# Patient Record
Sex: MALE | Race: BLACK OR AFRICAN AMERICAN | NOT HISPANIC OR LATINO | Employment: FULL TIME | URBAN - METROPOLITAN AREA
[De-identification: names, ages, dates, MRNs, and addresses within clinical notes are randomized per-mention and may not be internally consistent; named-entity substitution may affect disease eponyms.]

---

## 2019-09-17 ENCOUNTER — HOSPITAL ENCOUNTER (EMERGENCY)
Facility: HOSPITAL | Age: 19
Discharge: HOME | End: 2019-09-17
Attending: EMERGENCY MEDICINE

## 2019-09-17 ENCOUNTER — APPOINTMENT (EMERGENCY)
Dept: RADIOLOGY | Facility: HOSPITAL | Age: 19
End: 2019-09-17

## 2019-09-17 VITALS
TEMPERATURE: 97.7 F | DIASTOLIC BLOOD PRESSURE: 83 MMHG | RESPIRATION RATE: 16 BRPM | OXYGEN SATURATION: 100 % | WEIGHT: 194 LBS | SYSTOLIC BLOOD PRESSURE: 149 MMHG | HEART RATE: 59 BPM

## 2019-09-17 DIAGNOSIS — S41.131A GUNSHOT WOUND OF RIGHT UPPER ARM, INITIAL ENCOUNTER: Primary | ICD-10-CM

## 2019-09-17 PROCEDURE — 99283 EMERGENCY DEPT VISIT LOW MDM: CPT | Performed by: PHYSICIAN ASSISTANT

## 2019-09-17 PROCEDURE — 99284 EMERGENCY DEPT VISIT MOD MDM: CPT

## 2019-09-17 PROCEDURE — 73060 X-RAY EXAM OF HUMERUS: CPT

## 2019-09-17 RX ORDER — BACITRACIN, NEOMYCIN, POLYMYXIN B 400; 3.5; 5 [USP'U]/G; MG/G; [USP'U]/G
1 OINTMENT TOPICAL ONCE
Status: COMPLETED | OUTPATIENT
Start: 2019-09-17 | End: 2019-09-17

## 2019-09-17 RX ORDER — AMOXICILLIN 500 MG/1
500 CAPSULE ORAL 3 TIMES DAILY
Qty: 21 CAPSULE | Refills: 0 | Status: SHIPPED | OUTPATIENT
Start: 2019-09-17 | End: 2019-09-24

## 2019-09-17 RX ADMIN — BACITRACIN ZINC, NEOMYCIN SULFATE, AND POLYMYXIN B SULFATE 1 SMALL APPLICATION: 400; 3.5; 5 OINTMENT TOPICAL at 12:50

## 2019-09-17 NOTE — DISCHARGE INSTRUCTIONS
Cover wound with thin layer of Vaseline and bandage for 3-4 days  Continue to apply thin layer of vaseline for wound protection until healed  Apply ice to the wound for pain and swelling

## 2019-09-17 NOTE — ED NOTES
APD remains at [address] [facility] L, RN  09/17/19 2687 STEPHIE 
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
## 2019-09-26 ENCOUNTER — HOSPITAL ENCOUNTER (EMERGENCY)
Facility: HOSPITAL | Age: 19
Discharge: HOME | End: 2019-09-26
Attending: EMERGENCY MEDICINE

## 2019-09-26 VITALS
TEMPERATURE: 98.3 F | DIASTOLIC BLOOD PRESSURE: 60 MMHG | HEART RATE: 57 BPM | SYSTOLIC BLOOD PRESSURE: 129 MMHG | RESPIRATION RATE: 16 BRPM | WEIGHT: 197.75 LBS | OXYGEN SATURATION: 100 %

## 2019-09-26 DIAGNOSIS — T14.8XXA ABRASION: ICD-10-CM

## 2019-09-26 DIAGNOSIS — M79.601 RIGHT ARM PAIN: Primary | ICD-10-CM

## 2019-09-26 PROCEDURE — 99283 EMERGENCY DEPT VISIT LOW MDM: CPT

## 2019-09-26 PROCEDURE — 99282 EMERGENCY DEPT VISIT SF MDM: CPT | Performed by: PHYSICIAN ASSISTANT
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
## 2019-12-03 ENCOUNTER — HOSPITAL ENCOUNTER (EMERGENCY)
Facility: HOSPITAL | Age: 19
Discharge: HOME | End: 2019-12-03
Admitting: EMERGENCY MEDICINE
Payer: OTHER MISCELLANEOUS

## 2019-12-03 ENCOUNTER — APPOINTMENT (EMERGENCY)
Dept: RADIOLOGY | Facility: HOSPITAL | Age: 19
End: 2019-12-03
Payer: OTHER MISCELLANEOUS

## 2019-12-03 VITALS
TEMPERATURE: 97.7 F | SYSTOLIC BLOOD PRESSURE: 148 MMHG | RESPIRATION RATE: 18 BRPM | DIASTOLIC BLOOD PRESSURE: 99 MMHG | HEART RATE: 56 BPM | OXYGEN SATURATION: 97 %

## 2019-12-03 DIAGNOSIS — M25.511 ACUTE PAIN OF RIGHT SHOULDER: Primary | ICD-10-CM

## 2019-12-03 PROCEDURE — 73030 X-RAY EXAM OF SHOULDER: CPT

## 2019-12-03 PROCEDURE — 99283 EMERGENCY DEPT VISIT LOW MDM: CPT

## 2019-12-03 PROCEDURE — 99284 EMERGENCY DEPT VISIT MOD MDM: CPT | Performed by: EMERGENCY MEDICINE

## 2019-12-03 RX ORDER — LORATADINE 10 MG/1
10 TABLET ORAL
COMMUNITY
Start: 2014-09-25

## 2019-12-03 RX ORDER — IBUPROFEN 600 MG/1
600 TABLET ORAL EVERY 6 HOURS PRN
COMMUNITY
Start: 2019-11-27 | End: 2020-11-26

## 2019-12-03 RX ADMIN — DICLOFENAC 2 G: 10 GEL TOPICAL at 06:46

## 2019-12-03 NOTE — ED NOTES
Dr Edward Rivas and Dr Bill Carlton at bedside for patient evaluation        Vonda Willingham RN  12/03/19 7466

## 2019-12-03 NOTE — ED PROVIDER NOTES
History  Chief Complaint   Patient presents with    Shoulder Injury     Pt reports lifting package at work which began to fall and catching it with R arm      HPI    43-year-old male who presents to the ED for evaluation of right shoulder pain  The patient states that he works as a  at [employer], when he was lifting a crate of water when it fell pulling his right shoulder down  He has since had right shoulder pain, and while he states he is able to have full passive range of motion, he has significant pain with active range of motion beyond 90°  Pain localized nonradiating  Has not tried anything for his pain  The event occurred just prior to arrival   Denies any chest pain shortness of breath nausea, fevers chills remainder ROS negative  Prior to Admission Medications   Prescriptions Last Dose Informant Patient Reported? Taking?   ibuprofen (MOTRIN) 600 mg tablet   Yes Yes   Sig: Take 600 mg by mouth every 6 (six) hours as needed   loratadine (CLARITIN) 10 mg tablet   Yes Yes   Sig: 10 mg      Facility-Administered Medications: None       History reviewed  No pertinent past medical history  History reviewed  No pertinent surgical history  History reviewed  No pertinent family history  I have reviewed and agree with the history as documented  Social History     Tobacco Use    Smoking status: Never Smoker    Smokeless tobacco: Never Used   Substance Use Topics    Alcohol use: Not Currently    Drug use: Not Currently        Review of Systems   Constitutional: Negative for chills, fatigue and fever  HENT: Negative for nosebleeds and sore throat  Eyes: Negative for redness and visual disturbance  Respiratory: Negative for shortness of breath and wheezing  Cardiovascular: Negative for chest pain and palpitations  Gastrointestinal: Negative for abdominal pain and diarrhea  Endocrine: Negative for polyuria  Genitourinary: Negative for difficulty urinating and testicular pain  Musculoskeletal: Negative for back pain and neck stiffness  Right shoulder pain   Skin: Negative for rash and wound  Neurological: Negative for seizures, speech difficulty and headaches  Psychiatric/Behavioral: Negative for dysphoric mood and hallucinations  All other systems reviewed and are negative  Physical Exam  ED Triage Vitals [12/03/19 0522]   Temperature Pulse Respirations Blood Pressure SpO2   97 7 °F (36 5 °C) 56 18 148/99 97 %      Temp Source Heart Rate Source Patient Position - Orthostatic VS BP Location FiO2 (%)   Oral Monitor Lying Left arm --      Pain Score       5             Orthostatic Vital Signs  Vitals:    12/03/19 0522   BP: 148/99   Pulse: 56   Patient Position - Orthostatic VS: Lying       Physical Exam   Constitutional: He is oriented to person, place, and time  He appears well-developed and well-nourished  HENT:   Head: Normocephalic and atraumatic  Right Ear: External ear normal    Left Ear: External ear normal    Mouth/Throat: Oropharynx is clear and moist    Eyes: Conjunctivae and EOM are normal    Neck: Normal range of motion  Cardiovascular: Normal rate, regular rhythm, normal heart sounds and intact distal pulses  Pulmonary/Chest: Effort normal and breath sounds normal  He has no wheezes  He exhibits no tenderness  Abdominal: Soft  Bowel sounds are normal  There is no tenderness  There is no guarding  Musculoskeletal: Normal range of motion  On examination patient's right shoulder, does not appear to be dislocated, and shoulder is symmetric compared to the left, he has good sensation, radial medial ulnar nerve intact  Passively the shoulder can be ranged fully, however with active movement, the patient cannot go beyond 90°  He has difficulty with the lift-off test, Covington and Neer's test negative  Neurological: He is alert and oriented to person, place, and time  No cranial nerve deficit or sensory deficit  He exhibits normal muscle tone  Coordination normal    Skin: Skin is warm and dry  No rash noted  Psychiatric: He has a normal mood and affect  Nursing note and vitals reviewed  ED Medications  Medications - No data to display    Diagnostic Studies  Results Reviewed     None                 XR shoulder 2+ views RIGHT   ED Interpretation by Shelley Baker MD (12/03 0630)   No acute fracture      Final Result by Shaun Houston MD (12/03 0845)      No acute osseous abnormality  Workstation performed: [id number]               Procedures  Procedures      ED Course             MDM    60-year-old male who presents to ED for evaluation of right shoulder pain  X-ray shows no fracture or dislocation  The patient was given a sling  Orthopedic follow-up  Discharged home with return precautions  The patient was instructed to follow up as documented  Strict return precautions were discussed with the patient and the patient was instructed to return to the emergency department immediately if symptoms worsen  The patient/patient family member acknowledged and were in agreement with plan  Disposition  Final diagnoses:   Acute pain of right shoulder     Time reflects when diagnosis was documented in both MDM as applicable and the Disposition within this note     Time User Action Codes Description Comment    12/3/2019  6:30 AM Tamara Thomson Add [M25 511] Acute pain of right shoulder       ED Disposition     ED Disposition Condition Date/Time Comment    Discharge Stable Tue Dec 3, 2019  6:30 AM Flower Winters  discharge to home/self care              Follow-up Information     Follow up With Specialties Details Why Contact Info Additional [address] [facility] Surgery Schedule an appointment as soon as possible for a visit in 1 week For follow up regarding your symptoms and recheck [address] [phone] [facility] [place], [address] [place] [address], [place], [place], [address]  [facility]          Discharge Medication List as of 12/3/2019  6:30 AM      CONTINUE these medications which have NOT CHANGED    Details   ibuprofen (MOTRIN) 600 mg tablet Take 600 mg by mouth every 6 (six) hours as needed, Starting Wed 11/27/2019, Until Thu 11/26/2020, Historical Med      loratadine (CLARITIN) 10 mg tablet 10 mg, Starting Thu 9/25/2014, Historical Med           No discharge procedures on file  ED Provider  Attending physically available and evaluated Kenneth Amin I managed the patient along with the ED Attending      Electronically Signed by         Carolee Horn MD  12/05/19 3781

## 2019-12-10 ENCOUNTER — OFFICE VISIT (OUTPATIENT)
Dept: OBGYN CLINIC | Facility: OTHER | Age: 19
End: 2019-12-10

## 2019-12-10 VITALS
WEIGHT: 203 LBS | DIASTOLIC BLOOD PRESSURE: 77 MMHG | SYSTOLIC BLOOD PRESSURE: 123 MMHG | HEART RATE: 47 BPM | HEIGHT: 75 IN | BODY MASS INDEX: 25.24 KG/M2

## 2019-12-10 DIAGNOSIS — S46.911A STRAIN OF RIGHT SHOULDER, INITIAL ENCOUNTER: Primary | ICD-10-CM

## 2019-12-10 PROCEDURE — 99203 OFFICE O/P NEW LOW 30 MIN: CPT | Performed by: ORTHOPAEDIC SURGERY

## 2019-12-10 NOTE — PROGRESS NOTES
Assessment:       1  Strain of right shoulder, initial encounter          Plan:        Clinical and radiologic findings discussed with the patient, consistent with muscle strain of the right rotator cuff  Pain has resolved with conservative treatment  On exam, no deficiencies were appreciated and radiographs were unremarkable  At this time, patient can return to full duty without restrictions - letter written  Follow-up as needed  Subjective:     Patient ID: Jaden Paulino  is a 22 y o  male  Chief Complaint:  Right shoulder pain    HPI  69-year-old male here today in regards to follow-up from ER on 12/03/2019 for initial evaluation of right shoulder pain after work related injury  Based on ER note, patient works as a  at [employer]; patient lost control of a crate and had his right shoulder pulled down without clicking/popping  Described pain was located anteriorly  Radiographs revealed no osseous abnormality  He reports today that his symptoms have resolved for the past couple days  Denies any numbness/tingling, weakness, clicking/popping within the right shoulder  Denies prior history of injury of the right shoulder  He is currently out of work at [employer] and feels ready to return back to full duty  Social History     Occupational History    Not on file   Tobacco Use    Smoking status: Never Smoker    Smokeless tobacco: Never Used   Substance and Sexual Activity    Alcohol use: Not Currently    Drug use: Not Currently    Sexual activity: Not on file      Review of Systems   Constitutional: Negative for chills, fever and unexpected weight change  HENT: Negative for hearing loss, nosebleeds and sore throat  Eyes: Negative for pain, redness and visual disturbance  Respiratory: Negative for cough, shortness of breath and wheezing  Cardiovascular: Negative for chest pain, palpitations and leg swelling     Gastrointestinal: Negative for abdominal pain, nausea and vomiting  Endocrine: Negative for polyphagia and polyuria  Genitourinary: Negative for dysuria and hematuria  Musculoskeletal:        As per HPI   Skin: Negative for rash and wound  Neurological:        As per HPI   Psychiatric/Behavioral: Negative for decreased concentration and suicidal ideas  The patient is not nervous/anxious  Objective:     Ortho ExamPhysical Exam   Constitutional: He appears well-developed and well-nourished  No distress  HENT:   Head: Normocephalic and atraumatic  Right Ear: External ear normal    Left Ear: External ear normal    Nose: Nose normal    Mouth/Throat: Oropharynx is clear and moist  No oropharyngeal exudate  Eyes: Conjunctivae are normal    Neck: Normal range of motion  Neck supple  Cardiovascular: Normal rate  Pulmonary/Chest: Effort normal  No respiratory distress  Abdominal: Soft  Neurological: He is alert  Skin: Skin is warm and dry  He is not diaphoretic  Psychiatric: His behavior is normal        Right shoulder exam:  No abnormalities on inspection  Full range of motion  Strength 5/5 on abduction, external/internal rotation, flexion  Sensation intact  Negative Hawkin's, Neer's, Speed's, Yergason's, West, Cross-arm adduction  Negative apprehension  I have personally reviewed pertinent films in PACS and my interpretation is Radiographs of the right shoulder on 12/03/2019 revealing no osseous abnormality  I interviewed, took the history and examined the patient  I discussed the case with the Resident and reviewed the Residents note , prescribed medications, and orders placed  I supervised the Resident and I agree with the Resident management plan as it was presented to me  I was present in the clinic and examined the patient      Cary Aguirre MD 12/11/19

## 2019-12-10 NOTE — LETTER
December 10, 2019     Patient: Jb Nielsen  YOB: 2000   Date of Visit: 12/10/2019       To Whom it May Concern:    Ish Jackson is under my professional care  He was seen in my office on 12/10/2019  He is able to return to work at this time without restrictions  If you have any questions or concerns, please don't hesitate to call  Sincerely,          Niru High MD        CC: Yvon Crisostomo

## 2019-12-13 ENCOUNTER — TELEPHONE (OUTPATIENT)
Dept: OBGYN CLINIC | Facility: HOSPITAL | Age: 19
End: 2019-12-13

## 2019-12-13 NOTE — TELEPHONE ENCOUNTER
I believe any patient information can't be discussed over phone without patient's consent  Medical records may be accessed by works comp with patients consent
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
## 2019-12-21 NOTE — ED ATTENDING ATTESTATION
12/3/2019  IBrenda MD, saw and evaluated the patient  I have discussed the patient with the resident/non-physician practitioner and agree with the resident's/non-physician practitioner's findings, Plan of Care, and MDM as documented in the resident's/non-physician practitioner's note, except where noted  All available labs and Radiology studies were reviewed  I was present for key portions of any procedure(s) performed by the resident/non-physician practitioner and I was immediately available to provide assistance  At this point I agree with the current assessment done in the Emergency Department  I have conducted an independent evaluation of this patient a history and physical is as follows:    ED Course         Critical Care Time  Procedures    Patient is a pleasant 22 yom who had a crate of water fall and pull on his right arm  No notes achi right shoulder pain made sharp and worse with movement  No radiation of the pain  Lungs clear  No chest pain or head trauma  No f/c/s  MDM 22 yom, no fracture on xray, patient requesting a sling for comfort  Will have him followup with ortho  Gave verbal in addition to written discharge instructions and return precautions  I expressed that the follow up instructions were serious and should not be simply dismissed  Follow up is an integral part of the patients care and should NOT be taken lightly or dismissed  Patient expressed understanding of this and understands that follow up is now their responsibility  All questions were answered prior to discharge

## 2020-01-03 ENCOUNTER — HOSPITAL ENCOUNTER (EMERGENCY)
Facility: HOSPITAL | Age: 20
Discharge: HOME | End: 2020-01-03
Attending: EMERGENCY MEDICINE | Admitting: EMERGENCY MEDICINE
Payer: COMMERCIAL

## 2020-01-03 ENCOUNTER — APPOINTMENT (EMERGENCY)
Dept: CT IMAGING | Facility: HOSPITAL | Age: 20
End: 2020-01-03
Payer: COMMERCIAL

## 2020-01-03 VITALS
SYSTOLIC BLOOD PRESSURE: 146 MMHG | WEIGHT: 198.19 LBS | TEMPERATURE: 98.3 F | BODY MASS INDEX: 24.77 KG/M2 | OXYGEN SATURATION: 100 % | HEART RATE: 54 BPM | RESPIRATION RATE: 16 BRPM | DIASTOLIC BLOOD PRESSURE: 78 MMHG
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
## 2020-01-03 DIAGNOSIS — S09.90XA CLOSED HEAD INJURY, INITIAL ENCOUNTER: ICD-10-CM

## 2020-01-03 PROCEDURE — 99284 EMERGENCY DEPT VISIT MOD MDM: CPT

## 2020-01-03 PROCEDURE — 99284 EMERGENCY DEPT VISIT MOD MDM: CPT | Performed by: PHYSICIAN ASSISTANT

## 2020-01-03 PROCEDURE — 70450 CT HEAD/BRAIN W/O DYE: CPT

## 2020-01-03 RX ORDER — ACETAMINOPHEN 325 MG/1
650 TABLET ORAL ONCE
Status: COMPLETED | OUTPATIENT
Start: 2020-01-03 | End: 2020-01-03

## 2020-01-03 RX ADMIN — ACETAMINOPHEN 650 MG: 325 TABLET ORAL at 08:04
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
